# Patient Record
Sex: FEMALE | Race: BLACK OR AFRICAN AMERICAN | NOT HISPANIC OR LATINO | Employment: FULL TIME | URBAN - METROPOLITAN AREA
[De-identification: names, ages, dates, MRNs, and addresses within clinical notes are randomized per-mention and may not be internally consistent; named-entity substitution may affect disease eponyms.]

---

## 2023-04-18 ENCOUNTER — APPOINTMENT (OUTPATIENT)
Dept: LAB | Facility: LAB | Age: 28
End: 2023-04-18
Payer: COMMERCIAL

## 2024-04-16 ENCOUNTER — HOSPITAL ENCOUNTER (EMERGENCY)
Facility: HOSPITAL | Age: 29
Discharge: HOME | End: 2024-04-16
Attending: EMERGENCY MEDICINE
Payer: COMMERCIAL

## 2024-04-16 ENCOUNTER — APPOINTMENT (OUTPATIENT)
Dept: CARDIOLOGY | Facility: HOSPITAL | Age: 29
End: 2024-04-16
Payer: COMMERCIAL

## 2024-04-16 ENCOUNTER — APPOINTMENT (OUTPATIENT)
Dept: RADIOLOGY | Facility: HOSPITAL | Age: 29
End: 2024-04-16
Payer: COMMERCIAL

## 2024-04-16 VITALS
BODY MASS INDEX: 23.88 KG/M2 | OXYGEN SATURATION: 100 % | RESPIRATION RATE: 16 BRPM | HEART RATE: 72 BPM | WEIGHT: 143.3 LBS | HEIGHT: 65 IN | SYSTOLIC BLOOD PRESSURE: 107 MMHG | TEMPERATURE: 98.4 F | DIASTOLIC BLOOD PRESSURE: 70 MMHG
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
## 2024-04-16 DIAGNOSIS — R55 SYNCOPE, UNSPECIFIED SYNCOPE TYPE: ICD-10-CM

## 2024-04-16 DIAGNOSIS — S09.90XA CLOSED HEAD INJURY, INITIAL ENCOUNTER: ICD-10-CM

## 2024-04-16 LAB
ALBUMIN SERPL BCP-MCNC: 4.2 G/DL (ref 3.4–5)
ALP SERPL-CCNC: 36 U/L (ref 33–110)
ALT SERPL W P-5'-P-CCNC: 6 U/L (ref 7–45)
ANION GAP SERPL CALC-SCNC: 14 MMOL/L (ref 10–20)
AST SERPL W P-5'-P-CCNC: 13 U/L (ref 9–39)
ATRIAL RATE: 70 BPM
B-HCG SERPL-ACNC: <2 MIU/ML
BASOPHILS # BLD AUTO: 0.01 X10*3/UL (ref 0–0.1)
BASOPHILS NFR BLD AUTO: 0.3 %
BILIRUB SERPL-MCNC: 0.4 MG/DL (ref 0–1.2)
BUN SERPL-MCNC: 13 MG/DL (ref 6–23)
CALCIUM SERPL-MCNC: 9 MG/DL (ref 8.6–10.3)
CHLORIDE SERPL-SCNC: 106 MMOL/L (ref 98–107)
CO2 SERPL-SCNC: 24 MMOL/L (ref 21–32)
CREAT SERPL-MCNC: 0.85 MG/DL (ref 0.5–1.05)
EGFRCR SERPLBLD CKD-EPI 2021: >90 ML/MIN/1.73M*2
EOSINOPHIL # BLD AUTO: 0.14 X10*3/UL (ref 0–0.7)
EOSINOPHIL NFR BLD AUTO: 3.8 %
ERYTHROCYTE [DISTWIDTH] IN BLOOD BY AUTOMATED COUNT: 14.2 % (ref 11.5–14.5)
GLUCOSE SERPL-MCNC: 97 MG/DL (ref 74–99)
HCT VFR BLD AUTO: 35.2 % (ref 36–46)
HGB BLD-MCNC: 11.1 G/DL (ref 12–16)
IMM GRANULOCYTES # BLD AUTO: 0 X10*3/UL (ref 0–0.7)
IMM GRANULOCYTES NFR BLD AUTO: 0 % (ref 0–0.9)
LYMPHOCYTES # BLD AUTO: 2.1 X10*3/UL (ref 1.2–4.8)
LYMPHOCYTES NFR BLD AUTO: 57.1 %
MCH RBC QN AUTO: 27.1 PG (ref 26–34)
MCHC RBC AUTO-ENTMCNC: 31.5 G/DL (ref 32–36)
MCV RBC AUTO: 86 FL (ref 80–100)
MONOCYTES # BLD AUTO: 0.32 X10*3/UL (ref 0.1–1)
MONOCYTES NFR BLD AUTO: 8.7 %
NEUTROPHILS # BLD AUTO: 1.11 X10*3/UL (ref 1.2–7.7)
NEUTROPHILS NFR BLD AUTO: 30.1 %
NRBC BLD-RTO: 0 /100 WBCS (ref 0–0)
P AXIS: 70 DEGREES
P OFFSET: 187 MS
P ONSET: 135 MS
PLATELET # BLD AUTO: 208 X10*3/UL (ref 150–450)
POTASSIUM SERPL-SCNC: 4.1 MMOL/L (ref 3.5–5.3)
PR INTERVAL: 170 MS
PROT SERPL-MCNC: 6.8 G/DL (ref 6.4–8.2)
Q ONSET: 220 MS
QRS COUNT: 12 BEATS
QRS DURATION: 88 MS
QT INTERVAL: 388 MS
QTC CALCULATION(BAZETT): 419 MS
QTC FREDERICIA: 408 MS
R AXIS: 78 DEGREES
RBC # BLD AUTO: 4.09 X10*6/UL (ref 4–5.2)
SODIUM SERPL-SCNC: 140 MMOL/L (ref 136–145)
T AXIS: 60 DEGREES
T OFFSET: 414 MS
VENTRICULAR RATE: 70 BPM
WBC # BLD AUTO: 3.7 X10*3/UL (ref 4.4–11.3)

## 2024-04-16 PROCEDURE — 70486 CT MAXILLOFACIAL W/O DYE: CPT

## 2024-04-16 PROCEDURE — 36415 COLL VENOUS BLD VENIPUNCTURE: CPT | Performed by: EMERGENCY MEDICINE

## 2024-04-16 PROCEDURE — 84702 CHORIONIC GONADOTROPIN TEST: CPT | Performed by: EMERGENCY MEDICINE

## 2024-04-16 PROCEDURE — 93005 ELECTROCARDIOGRAM TRACING: CPT

## 2024-04-16 PROCEDURE — 96360 HYDRATION IV INFUSION INIT: CPT

## 2024-04-16 PROCEDURE — 70486 CT MAXILLOFACIAL W/O DYE: CPT | Performed by: RADIOLOGY

## 2024-04-16 PROCEDURE — 70450 CT HEAD/BRAIN W/O DYE: CPT

## 2024-04-16 PROCEDURE — 76376 3D RENDER W/INTRP POSTPROCES: CPT | Performed by: RADIOLOGY

## 2024-04-16 PROCEDURE — 84075 ASSAY ALKALINE PHOSPHATASE: CPT | Performed by: EMERGENCY MEDICINE

## 2024-04-16 PROCEDURE — 76377 3D RENDER W/INTRP POSTPROCES: CPT

## 2024-04-16 PROCEDURE — 70450 CT HEAD/BRAIN W/O DYE: CPT | Performed by: RADIOLOGY

## 2024-04-16 PROCEDURE — 85025 COMPLETE CBC W/AUTO DIFF WBC: CPT | Performed by: EMERGENCY MEDICINE

## 2024-04-16 PROCEDURE — 2500000004 HC RX 250 GENERAL PHARMACY W/ HCPCS (ALT 636 FOR OP/ED): Performed by: EMERGENCY MEDICINE

## 2024-04-16 PROCEDURE — 2500000001 HC RX 250 WO HCPCS SELF ADMINISTERED DRUGS (ALT 637 FOR MEDICARE OP): Performed by: EMERGENCY MEDICINE

## 2024-04-16 PROCEDURE — 99285 EMERGENCY DEPT VISIT HI MDM: CPT | Mod: 25

## 2024-04-16 RX ORDER — BACITRACIN ZINC 500 UNIT/G
1 OINTMENT IN PACKET (EA) TOPICAL ONCE
Status: COMPLETED | OUTPATIENT
Start: 2024-04-16 | End: 2024-04-16

## 2024-04-16 RX ADMIN — SODIUM CHLORIDE, SODIUM LACTATE, POTASSIUM CHLORIDE, AND CALCIUM CHLORIDE 1000 ML: 600; 310; 30; 20 INJECTION, SOLUTION INTRAVENOUS at 13:14

## 2024-04-16 RX ADMIN — BACITRACIN ZINC 1 APPLICATION: 500 OINTMENT TOPICAL at 15:05

## 2024-04-16 ASSESSMENT — COLUMBIA-SUICIDE SEVERITY RATING SCALE - C-SSRS
2. HAVE YOU ACTUALLY HAD ANY THOUGHTS OF KILLING YOURSELF?: NO
6. HAVE YOU EVER DONE ANYTHING, STARTED TO DO ANYTHING, OR PREPARED TO DO ANYTHING TO END YOUR LIFE?: NO
1. IN THE PAST MONTH, HAVE YOU WISHED YOU WERE DEAD OR WISHED YOU COULD GO TO SLEEP AND NOT WAKE UP?: NO

## 2024-04-16 ASSESSMENT — PAIN DESCRIPTION - LOCATION: LOCATION: FACE

## 2024-04-16 ASSESSMENT — PAIN DESCRIPTION - PAIN TYPE: TYPE: ACUTE PAIN

## 2024-04-16 ASSESSMENT — PAIN SCALES - GENERAL
PAINLEVEL_OUTOF10: 7
PAINLEVEL_OUTOF10: 0 - NO PAIN

## 2024-04-16 ASSESSMENT — PAIN - FUNCTIONAL ASSESSMENT: PAIN_FUNCTIONAL_ASSESSMENT: 0-10

## 2024-04-16 NOTE — ED PROVIDER NOTES
History/Exam limitations: none.   Additional history was obtained from patient.          HPI:    Irene Soto is a 28 y.o. female reportedly no past medical history presenting after syncopal event.  Patient reportedly was shadowing in the ICU and blood was being drawn which she is seen before without issue.  States that she got lightheaded and dropped down to her knees and briefly lost conscious, no jerking movements reportedly.  She went into a chair and reportedly initially felt slightly improved then had increasing feeling of lightheadedness as if her vision was going then she reports woke up based on the ground and had some jerking movements where she struck her nose on the ground a couple of times after she gained consciousness.  States she has a history of passing out remotely.  Denies any family history of sudden cardiac death.  Denies any seizure history.  Has mild discomfort to her nose and right side of her neck.  No focal weakness or numbness.  No headache, vision changes, chest pain, shortness breath, palpitations, abdominal pain, diarrhea, dysuria.  There were reportedly transient jerking type motions visualized after the 2nd episode of syncope.           Physical Exam:  ED Triage Vitals [04/16/24 1149]   Temperature Heart Rate Respirations BP   36.9 °C (98.4 °F) 79 20 111/71      Pulse Ox Temp Source Heart Rate Source Patient Position   100 % Tympanic Monitor Lying      BP Location FiO2 (%)     Left arm --        GEN:      Alert, NAD  Eyes:       PERRL, EOMI  HENT:      6 mm linear abrasion to right upper nasal bridge, otherwise NC/AT, no visible septal hematoma, OP clear, airway patent, MM  CV:      RRR, no MRG, no LE pitting edema, 2+ radial and pedal pulses  PULM:     CTAB, no w/r/r, easy WOB, symmetric chest rise  ABD:      Soft, NT, ND, no masses, BS +  :       No CVA TTP  NEURO:   A/Ox4, CN II-XII intact, strength 5/5 in all 4 ext, SILT, FNF normal b/l, no pronator drift  MSK:      FROM,  no joint deformities or swelling, mild right paraspinal neck tenderness, no midline CTL spine tenderness  SKIN:       Warm and dry  PSYCH:    Appropriate mood and affect         MDM/ED Course:   Irene Soto is a 28 y.o. female reportedly no past medical history presenting after syncopal event.  Vitals reassuring.  Exam as documented above.  Nontoxic, does not appear lethargic or postictal.  Differential includes syncope secondary to vasovagal, orthostatic hypotension, dehydration, arrhythmia, infectious etiology, ACS, stroke. Unlikely ACS given no chest pressure, chest pain. Doubt TIA/stroke given no report of focal neurologic deficits and evidence of this on exam, age, risk factors.  EKG as below.  No family history of sudden cardiac death.  Episode sounds concerning for vasovagal episode.  Patient has myoclonic jerks, woke up to some jerking type movements briefly.  Low suspicion for seizure given patient had an episode of syncope that was transient followed by resolution and then additional episode of syncope that followed with some brief myoclonic jerks, suspect syncope related myoclonic movements.  IV placed and labs drawn.  hCG negative.  Hemoglobin reassuring at 11.1, consistent with prior.  Chemistry reassuring.  CT head and face obtained and overall reassuring with no acute intracranial findings and no facial fracture.  Linear abrasion to right aspect of nasal bridge, superficial, bacitracin applied.  C-spine was cleared [facility] [employer] [facility] [employer].  Reassessment, patient feels comfortable discharge.  Ambulatory tolerating p.o.  Patient given IV fluids.  Patient was explained findings, exam/study results, the importance of follow up and the plan moving forward; patient verbalized understanding and agreement. All questions were answered and no new questions at this time. Patient will be discharged with strict return precautions, follow up plan with PCP.    EKG reviewed by me: 12:10 PM normal sinus rhythm,  rate 70, normal axis, normal intervals, sinus arrhythmia present, no signs of Brugada/WPW/HOCM/epsilon wave/prolonged QTc, no prior for comparison.     ED Course as of 04/17/24 0204 Tue Apr 16, 2024   1245 C-spine cleared Via [employer].  [JM]      ED Course User Index  [JM] Edward De Dios MD         Diagnoses as of 04/17/24 0204   Syncope, unspecified syncope type   Closed head injury, initial encounter         Chronic medical conditions affecting care listed in MDM. I independently reviewed imaging studies and agreed with radiology reads. I reviewed recent and relevant outside records including PCP notes, Prior discharge summaries, and prior radiology reports.    Procedure  Procedures    Diagnosis:   1.  Syncope    Dispo: Discharged in stable condition      Disclaimer: Portions of this note were dictated by speech recognition. An attempt at proof reading was made to minimize errors. Minor errors in transcription may be present.  Please call if questions.     Edward De Dios MD  04/17/24 0205

## 2024-04-16 NOTE — DISCHARGE INSTRUCTIONS
You were seen emergency today for evaluation of syncope/passing out.  Please follow-up your primary care provider within the next week regarding this emergency room stay.  Please return for worsening symptoms including not limited to worsening recurrent episodes of passing out, headache, vision changes, chest pain, palpitations, shortness of breath, fever, or if any other concerns.  Please continue antibiotic ointment to the abrasion on your nose.

## 2024-04-16 NOTE — ED NOTES
Pt arrived from a clinical rotation. Pt passed out and hit head on floor. Provider at bedside witnessed seizure like activity. Pt has small laceration to her R nare. Pt c/o head pain, but has no neck or back pain. Placed C-Collar on patient. Pt was transferred to [facility] ED. Placed pt on the monitor.     Garry Irvin RN  04/16/24 9541

## 2024-06-26 DIAGNOSIS — J30.1 SEASONAL ALLERGIC RHINITIS DUE TO POLLEN: Primary | ICD-10-CM

## 2024-06-26 RX ORDER — CETIRIZINE HYDROCHLORIDE 10 MG/1
10 TABLET ORAL DAILY PRN
Qty: 30 TABLET | Refills: 0 | Status: SHIPPED | OUTPATIENT
Start: 2024-06-26

## 2024-06-26 RX ORDER — AZELASTINE HYDROCHLORIDE 0.5 MG/ML
SOLUTION/ DROPS OPHTHALMIC
Qty: 6 ML | Refills: 0 | Status: SHIPPED | OUTPATIENT
Start: 2024-06-26

## 2024-06-26 RX ORDER — AZELASTINE 1 MG/ML
SPRAY, METERED NASAL
Qty: 30 ML | Refills: 0 | Status: SHIPPED | OUTPATIENT
Start: 2024-06-26

## 2024-06-26 RX ORDER — FLUTICASONE PROPIONATE 50 MCG
2 SPRAY, SUSPENSION (ML) NASAL DAILY
Qty: 16 ML | Refills: 0 | Status: SHIPPED | OUTPATIENT
Start: 2024-06-26

## 2024-07-29 DIAGNOSIS — J30.1 SEASONAL ALLERGIC RHINITIS DUE TO POLLEN: ICD-10-CM

## 2024-07-30 RX ORDER — CETIRIZINE HYDROCHLORIDE 10 MG/1
10 TABLET ORAL DAILY PRN
Qty: 30 TABLET | Refills: 0 | Status: SHIPPED | OUTPATIENT
Start: 2024-07-30

## 2024-07-30 RX ORDER — FLUTICASONE PROPIONATE 50 MCG
2 SPRAY, SUSPENSION (ML) NASAL DAILY
Qty: 16 ML | Refills: 0 | Status: SHIPPED | OUTPATIENT
Start: 2024-07-30

## 2024-07-30 RX ORDER — AZELASTINE 1 MG/ML
SPRAY, METERED NASAL
Qty: 30 ML | Refills: 0 | Status: SHIPPED | OUTPATIENT
Start: 2024-07-30

## 2024-08-01 ENCOUNTER — TELEPHONE (OUTPATIENT)
Dept: ALLERGY | Facility: HOSPITAL | Age: 29
End: 2024-08-01

## 2025-01-30 ENCOUNTER — APPOINTMENT (OUTPATIENT)
Dept: OBSTETRICS AND GYNECOLOGY | Facility: CLINIC | Age: 30
End: 2025-01-30
Payer: COMMERCIAL

## 2025-02-24 ENCOUNTER — APPOINTMENT (OUTPATIENT)
Dept: OBSTETRICS AND GYNECOLOGY | Facility: CLINIC | Age: 30
End: 2025-02-24
Payer: COMMERCIAL

## 2025-04-21 ENCOUNTER — APPOINTMENT (OUTPATIENT)
Dept: OBSTETRICS AND GYNECOLOGY | Facility: CLINIC | Age: 30
End: 2025-04-21
Payer: COMMERCIAL

## 2025-04-29 ENCOUNTER — APPOINTMENT (OUTPATIENT)
Dept: PRIMARY CARE | Facility: CLINIC | Age: 30
End: 2025-04-29
Payer: COMMERCIAL

## 2025-04-29 VITALS
SYSTOLIC BLOOD PRESSURE: 101 MMHG | DIASTOLIC BLOOD PRESSURE: 67 MMHG | BODY MASS INDEX: 25.27 KG/M2 | WEIGHT: 161 LBS | HEIGHT: 67 IN | HEART RATE: 83 BPM | OXYGEN SATURATION: 100 %

## 2025-04-29 DIAGNOSIS — F41.1 GAD (GENERALIZED ANXIETY DISORDER): ICD-10-CM

## 2025-04-29 DIAGNOSIS — Z11.3 ROUTINE SCREENING FOR STI (SEXUALLY TRANSMITTED INFECTION): ICD-10-CM

## 2025-04-29 DIAGNOSIS — Z11.59 ENCOUNTER FOR HEPATITIS C SCREENING TEST FOR LOW RISK PATIENT: ICD-10-CM

## 2025-04-29 DIAGNOSIS — E53.8 B12 DEFICIENCY: ICD-10-CM

## 2025-04-29 DIAGNOSIS — Z00.00 ENCOUNTER FOR PREVENTATIVE ADULT HEALTH CARE EXAMINATION: Primary | ICD-10-CM

## 2025-04-29 DIAGNOSIS — K58.1 IRRITABLE BOWEL SYNDROME WITH CONSTIPATION: ICD-10-CM

## 2025-04-29 DIAGNOSIS — F33.2 SEVERE EPISODE OF RECURRENT MAJOR DEPRESSIVE DISORDER, WITHOUT PSYCHOTIC FEATURES (MULTI): ICD-10-CM

## 2025-04-29 DIAGNOSIS — R45.86 MOOD SWINGS: ICD-10-CM

## 2025-04-29 DIAGNOSIS — E04.9 ENLARGED THYROID: ICD-10-CM

## 2025-04-29 DIAGNOSIS — E61.1 IRON DEFICIENCY: ICD-10-CM

## 2025-04-29 DIAGNOSIS — J30.1 SEASONAL ALLERGIC RHINITIS DUE TO POLLEN: ICD-10-CM

## 2025-04-29 PROCEDURE — 1036F TOBACCO NON-USER: CPT | Performed by: INTERNAL MEDICINE

## 2025-04-29 PROCEDURE — 3008F BODY MASS INDEX DOCD: CPT | Performed by: INTERNAL MEDICINE

## 2025-04-29 PROCEDURE — 99214 OFFICE O/P EST MOD 30 MIN: CPT | Performed by: INTERNAL MEDICINE

## 2025-04-29 PROCEDURE — 99395 PREV VISIT EST AGE 18-39: CPT | Performed by: INTERNAL MEDICINE

## 2025-04-29 RX ORDER — AZELASTINE 1 MG/ML
1 SPRAY, METERED NASAL 2 TIMES DAILY
Qty: 30 ML | Refills: 0 | Status: SHIPPED | OUTPATIENT
Start: 2025-04-29

## 2025-04-29 RX ORDER — CETIRIZINE HYDROCHLORIDE 10 MG/1
10 TABLET ORAL DAILY PRN
Qty: 90 TABLET | Refills: 1 | Status: SHIPPED | OUTPATIENT
Start: 2025-04-29

## 2025-04-29 RX ORDER — FLUTICASONE PROPIONATE 50 MCG
2 SPRAY, SUSPENSION (ML) NASAL DAILY
Qty: 16 ML | Refills: 0 | Status: SHIPPED | OUTPATIENT
Start: 2025-04-29

## 2025-04-29 ASSESSMENT — PATIENT HEALTH QUESTIONNAIRE - PHQ9
8. MOVING OR SPEAKING SO SLOWLY THAT OTHER PEOPLE COULD HAVE NOTICED. OR THE OPPOSITE, BEING SO FIGETY OR RESTLESS THAT YOU HAVE BEEN MOVING AROUND A LOT MORE THAN USUAL: SEVERAL DAYS
6. FEELING BAD ABOUT YOURSELF - OR THAT YOU ARE A FAILURE OR HAVE LET YOURSELF OR YOUR FAMILY DOWN: MORE THAN HALF THE DAYS
10. IF YOU CHECKED OFF ANY PROBLEMS, HOW DIFFICULT HAVE THESE PROBLEMS MADE IT FOR YOU TO DO YOUR WORK, TAKE CARE OF THINGS AT HOME, OR GET ALONG WITH OTHER PEOPLE: VERY DIFFICULT
SUM OF ALL RESPONSES TO PHQ9 QUESTIONS 1 & 2: 5
2. FEELING DOWN, DEPRESSED OR HOPELESS: NEARLY EVERY DAY
4. FEELING TIRED OR HAVING LITTLE ENERGY: NEARLY EVERY DAY
1. LITTLE INTEREST OR PLEASURE IN DOING THINGS: MORE THAN HALF THE DAYS
5. POOR APPETITE OR OVEREATING: NEARLY EVERY DAY
9. THOUGHTS THAT YOU WOULD BE BETTER OFF DEAD, OR OF HURTING YOURSELF: NOT AT ALL
SUM OF ALL RESPONSES TO PHQ QUESTIONS 1-9: 20
3. TROUBLE FALLING OR STAYING ASLEEP: NEARLY EVERY DAY
7. TROUBLE CONCENTRATING ON THINGS, SUCH AS READING THE NEWSPAPER OR WATCHING TELEVISION: NEARLY EVERY DAY

## 2025-04-29 ASSESSMENT — ANXIETY QUESTIONNAIRES
7. FEELING AFRAID AS IF SOMETHING AWFUL MIGHT HAPPEN: MORE THAN HALF THE DAYS
GAD7 TOTAL SCORE: 20
2. NOT BEING ABLE TO STOP OR CONTROL WORRYING: NEARLY EVERY DAY
4. TROUBLE RELAXING: NEARLY EVERY DAY
3. WORRYING TOO MUCH ABOUT DIFFERENT THINGS: NEARLY EVERY DAY
5. BEING SO RESTLESS THAT IT IS HARD TO SIT STILL: NEARLY EVERY DAY
IF YOU CHECKED OFF ANY PROBLEMS ON THIS QUESTIONNAIRE, HOW DIFFICULT HAVE THESE PROBLEMS MADE IT FOR YOU TO DO YOUR WORK, TAKE CARE OF THINGS AT HOME, OR GET ALONG WITH OTHER PEOPLE: EXTREMELY DIFFICULT
1. FEELING NERVOUS, ANXIOUS, OR ON EDGE: NEARLY EVERY DAY
6. BECOMING EASILY ANNOYED OR IRRITABLE: NEARLY EVERY DAY

## 2025-04-29 ASSESSMENT — PAIN SCALES - GENERAL: PAINLEVEL_OUTOF10: 0-NO PAIN

## 2025-04-29 NOTE — PROGRESS NOTES
Subjective   Patient ID: Irene Soto is a 29 y.o. female who presents for No chief complaint on file..  History of Present Illness  29-year-old female here for preventive care visit, last seen 1/23.  She presented to the ED last year out of concern for syncope with myoclonic jerks, workup was unrevealing including CT head and maxillofacial bones.    Approximately a year ago, she experienced two fainting spells, one accompanied by seizure-like movements. During these episodes, she felt dizzy and lightheaded, slid down a wall, and experienced jerking movements after sitting down. She did not lose consciousness completely but felt as though she might pass out again. A CT scan showed no facial fractures, and an EKG was normal. She has not had similar episodes since then.    A similar episode occurred in 2017 while she was pregnant, involving nausea, vomiting, and jerking movements while sitting. She was evaluated at [facility], and no significant issues were found. Both episodes were associated with fainting spells.    She is experiencing significant anxiety and depression, with symptoms such as feeling down, trouble sleeping, low energy, poor appetite, and difficulty concentrating. These symptoms have been present for more than half of the days in the past two weeks. She is not currently on any medications for these conditions but has previously responded to Zoloft.    She is  and has three children, aged nine, seven, and five, with whom she shares custody with their father. She is considering a travel assignment in [place] to be closer to family, which she believes may help with her current emotional state. Her social history includes working as a nurse, and she is in the process of changing jobs. She has no history of tobacco, alcohol, or drug use, and her diet and exercise routine have been inconsistent.    Family history includes oral cancer in her mother, brain cancer in her grandmother, and  breast cancer in her paternal grandmother. Her father is healthy, and her children are also healthy.    She reports feeling dizzy and lightheaded before fainting spells, but no nausea during the most recent episode. No confusion, tongue biting, incontinence, or significant confusion post-episode.      History  - Allergic rhintis with possible seafood allergy given flonase + azelastine, olaptadine   - Depression/anxiety - previously 1followed by therapist, previously on sertraline referred to psychiatry out of concern for mood disorder  - B12 deficiency  - IBS - mixed -recommend low FODMAP diet  - Iron deficiency     Social:    -  from , 3 children ages 9, 7, 5.   - Nurse! Graduated from nursing school, previously at [employer]   - Diet - gain weight since last being seen, trying to keep it healthy, not a huge eater.   - Exercise - less lately   - Sleep - not much or too much.         PMHx, FHx, Social Hx, Surg Hx personally reviewed at this appointment. No pertinent findings and/or changes from prior (if applicable).      Objective     There were no vitals taken for this visit.   General: Awake, alert, appears stated age   Head/eyes/ears: NCAT, EOMI, PERRL, TM WNL, no cerumen  Throat: moist mucus membranes, no pharyngeal erythema,   Neck: Supple, nontender, no lymphadenopathy, thyroid enlarged, no discreet nodules appreciated   Breast exam: declined   Heart: RRR, no murmurs, rubs or gallops  Lungs: CTA bilaterally, no rhonchi rales or wheezes   Abdomen: Soft, NT/ND  Extremities: No edema, 2+ DP pulses   Skin: No concerning skin lesions on visualized skin   Neuro: AAO x 3, no FND, gait unremarkable       Lab Results   Component Value Date    WBC 3.7 (L) 04/16/2024    HGB 11.1 (L) 04/16/2024    HCT 35.2 (L) 04/16/2024     04/16/2024    CHOL 103 01/18/2023    TRIG 45 01/18/2023    HDL 42.5 01/18/2023    ALT 6 (L) 04/16/2024    AST 13 04/16/2024     04/16/2024    K 4.1 04/16/2024      04/16/2024    CREATININE 0.85 04/16/2024    BUN 13 04/16/2024    CO2 24 04/16/2024    TSH 1.04 01/18/2023    HGBA1C 4.9 03/04/2022         Current Outpatient Medications   Medication Instructions    azelastine (Astelin) 137 mcg (0.1 %) nasal spray INSTILL 2 SPRAY INTO NOSTRILS TWICE DAILY AS NEEDED    azelastine (Optivar) 0.05 % ophthalmic solution INSTILL 1 DROP INTO BOTH EYES TWICE A DAY AS NEEDED    cetirizine (ZYRTEC) 10 mg, oral, Daily PRN    fluticasone (Flonase) 50 mcg/actuation nasal spray 2 sprays, Each Nostril, Daily        CT head wo IV contrast, CT maxillofacial bones wo IV contrast, CT 3D reconstruction  Narrative: Interpreted By:  Terrence Santo,   STUDY:  CT HEAD WO IV CONTRAST; CT FACIAL BONES WO IV CONTRAST; CT 3D  RECONSTRUCTION;  4/16/2024 2:52 pm      INDICATION:  Signs/Symptoms:fall , head strike; Signs/Symptoms:fall, head strike;  Signs/Symptoms:fall.      COMPARISON:  None.      ACCESSION NUMBER(S):  [id number]; [id number]; [id number]      ORDERING CLINICIAN:  TEX MIRELES      TECHNIQUE:  Noncontrast axial CT scan of head and facial bones was performed.  Angled reformats in brain and bone windows were generated. The images  were reviewed in bone, brain, blood and soft tissue windows. 3D  volume rendered images of the facial bones were reconstructed and  reviewed.      FINDINGS:  There is no intra/extra-axial fluid collection, mass effect, or  midline shift. The gray/white matter junction is preserved. The basal  cisterns are patent. The bilateral globes are intact. Partial  opacification of the right ethmoid air cells is noted. Other  paranasal sinuses and mastoid air cells are clear. There is no acute  fracture or dislocation in the facial and cranial bones.      Impression: No evidence of acute cortical infarct or intracranial hemorrhage.      No acute fracture or dislocation in the facial and cranial bones.      Intact bilateral globes.      Signed by: Terrence Santo  4/16/2024 3:26 PM  Dictation workstation:   [id number]  ECG 12 lead  Normal sinus rhythm with sinus arrhythmia  Normal ECG  No previous ECGs available  See ED provider note for full interpretation and clinical correlation  Confirmed by Ruth Ann Braun ([id number]) on 4/16/2024 2:20:31 PM           Assessment & Plan  Adult Health Examination  Age appropriate screening performed   No additional pertinent family history or toxic habits   No high risk sexual behavior, interested in  full STI screen  Cancer screening:   - Discussed self breast exams  - Pap smear 3/21 due for repeat and scheduled   - Skin cancer prevention strategies reviewed   Immunizations UTD   Dental and visual examinations UTD   Discussed adequate Vitamin D intake        Wellness Visit  Routine wellness visit. Family history of cancer noted. No current cancer screening concerns beyond scheduled Pap smear. Immunizations up to date.  - Proceed with scheduled Pap smear  - Encourage self breast exams and skin cancer prevention measures.  - Maintain healthy diet and exercise routine.  - Ensure adequate sleep.  - Continue vitamin D supplementation.  - STI screening ordered.      Depression and Anxiety  Moderate major depressive disorder and anxiety with symptoms of depressed mood, insomnia, fatigue, anorexia, impaired concentration, nervousness, uncontrollable worrying, and restlessness. Previously responsive to sertraline. Discussed need for psychiatric evaluation before medication due to sertraline risks.  - Refer to access clinic for psychiatric evaluation and diagnostic assessment.  - Refer to behavioral health for therapy.  - Consider initiating medication post-psychiatric evaluation.  - Schedule follow-up in 3 months to assess progress.    Syncope with Myoclonic Jerks  Syncope with myoclonic jerks, last episode one year ago. Normal EKG and CT scan. No recurrence or seizure activity.  - Consider neurologist referral if symptoms recur or further  evaluation is desired.    Irritable Bowel Syndrome (IBS) - mixed  IBS with mixed constipation and diarrhea, triggered by certain foods, particularly dairy. Low FODMAP diet attempted, cheese identified as a trigger.  - Continue low FODMAP diet and avoid known triggers, particularly cheese.  - Adjust diet based on symptom monitoring.    Thyromegaly   - Thyroid ultrasound ordered, check TFTs      Followup 3 months           Daniela Sifuentes, DO       This medical note was created with the assistance of artificial intelligence (AI) for documentation purposes. The content has been reviewed and confirmed by the healthcare provider for accuracy and completeness. Patient consented to the use of audio recording and use of AI during their visit.

## 2025-04-29 NOTE — PATIENT INSTRUCTIONS
VISIT SUMMARY:  Today, you were seen for a follow-up on your fainting spells and seizure-like activity, as well as to address your anxiety, depression, and irritable bowel syndrome (IBS). We also conducted a routine wellness visit to review your overall health and preventive care measures.    YOUR PLAN:  -DEPRESSION AND ANXIETY: You have been experiencing moderate depression and anxiety, which includes feeling down, trouble sleeping, low energy, poor appetite, and difficulty concentrating. We discussed the need for a psychiatric evaluation before starting any medication due to the risks associated with sertraline, which you have responded to in the past. You will be referred to an access clinic for a psychiatric evaluation and to behavioral health for therapy. We will consider starting medication after the psychiatric evaluation and will follow up in 3 months to assess your progress.    -SYNCOPE WITH MYOCLONIC JERKS: You have a history of fainting spells with jerking movements, but no episodes have occurred in the past year. Your previous EKG and CT scan were normal. If these symptoms recur or if you desire further evaluation, we may refer you to a neurologist.    -IRRITABLE BOWEL SYNDROME (IBS) - MIXED: You have IBS, which causes both constipation and diarrhea, and is triggered by certain foods, especially dairy. You have been following a low FODMAP diet and identified cheese as a trigger. Continue with the low FODMAP diet and avoid known triggers, adjusting your diet based on your symptoms.    -WELLNESS VISIT: This was a routine wellness visit. We noted your family history of cancer but found no current concerns beyond your scheduled Pap smear. Your immunizations are up to date. Continue with self breast exams, skin cancer prevention, a healthy diet, regular exercise, adequate sleep, and vitamin D supplementation. STI screening has been ordered.    INSTRUCTIONS:  Please follow up in 3 months to assess your  progress with depression and anxiety. Proceed with your scheduled Pap smear. If your fainting spells or seizure-like activity recur, contact us for a possible referral to a neurologist.   Thyroid ultrasound has been ordered. Call the imaging department to have this scheduled.

## 2025-05-02 ENCOUNTER — APPOINTMENT (OUTPATIENT)
Dept: RADIOLOGY | Facility: CLINIC | Age: 30
End: 2025-05-02
Payer: COMMERCIAL

## 2025-05-03 LAB
25(OH)D3+25(OH)D2 SERPL-MCNC: 24 NG/ML (ref 30–100)
ALBUMIN SERPL-MCNC: 4.6 G/DL (ref 3.6–5.1)
ALP SERPL-CCNC: 50 U/L (ref 31–125)
ALT SERPL-CCNC: 12 U/L (ref 6–29)
ANION GAP SERPL CALCULATED.4IONS-SCNC: 8 MMOL/L (CALC) (ref 7–17)
AST SERPL-CCNC: 19 U/L (ref 10–30)
BASOPHILS # BLD AUTO: 18 CELLS/UL (ref 0–200)
BASOPHILS NFR BLD AUTO: 0.3 %
BILIRUB SERPL-MCNC: 0.4 MG/DL (ref 0.2–1.2)
BUN SERPL-MCNC: 13 MG/DL (ref 7–25)
C TRACH RRNA SPEC QL NAA+PROBE: NOT DETECTED
CALCIUM SERPL-MCNC: 9.6 MG/DL (ref 8.6–10.2)
CHLORIDE SERPL-SCNC: 105 MMOL/L (ref 98–110)
CHOLEST SERPL-MCNC: 132 MG/DL
CHOLEST/HDLC SERPL: 2.4 (CALC)
CO2 SERPL-SCNC: 26 MMOL/L (ref 20–32)
CREAT SERPL-MCNC: 0.85 MG/DL (ref 0.5–0.96)
EGFRCR SERPLBLD CKD-EPI 2021: 95 ML/MIN/1.73M2
EOSINOPHIL # BLD AUTO: 177 CELLS/UL (ref 15–500)
EOSINOPHIL NFR BLD AUTO: 3 %
ERYTHROCYTE [DISTWIDTH] IN BLOOD BY AUTOMATED COUNT: 13.6 % (ref 11–15)
EST. AVERAGE GLUCOSE BLD GHB EST-MCNC: 100 MG/DL
EST. AVERAGE GLUCOSE BLD GHB EST-SCNC: 5.5 MMOL/L
FERRITIN SERPL-MCNC: 13 NG/ML (ref 16–154)
GLUCOSE SERPL-MCNC: 79 MG/DL (ref 65–139)
HBA1C MFR BLD: 5.1 %
HBV SURFACE AG SERPL QL IA: NORMAL
HCT VFR BLD AUTO: 38.6 % (ref 35–45)
HCV AB SERPL QL IA: NORMAL
HDLC SERPL-MCNC: 55 MG/DL
HGB BLD-MCNC: 11.9 G/DL (ref 11.7–15.5)
HIV 1+2 AB+HIV1 P24 AG SERPL QL IA: NORMAL
IRON SATN MFR SERPL: 29 % (CALC) (ref 16–45)
IRON SERPL-MCNC: 108 MCG/DL (ref 40–190)
LDLC SERPL CALC-MCNC: 64 MG/DL (CALC)
LYMPHOCYTES # BLD AUTO: 2195 CELLS/UL (ref 850–3900)
LYMPHOCYTES NFR BLD AUTO: 37.2 %
MCH RBC QN AUTO: 26.9 PG (ref 27–33)
MCHC RBC AUTO-ENTMCNC: 30.8 G/DL (ref 32–36)
MCV RBC AUTO: 87.1 FL (ref 80–100)
MONOCYTES # BLD AUTO: 596 CELLS/UL (ref 200–950)
MONOCYTES NFR BLD AUTO: 10.1 %
N GONORRHOEA RRNA SPEC QL NAA+PROBE: NOT DETECTED
NEUTROPHILS # BLD AUTO: 2915 CELLS/UL (ref 1500–7800)
NEUTROPHILS NFR BLD AUTO: 49.4 %
NONHDLC SERPL-MCNC: 77 MG/DL (CALC)
PLATELET # BLD AUTO: 266 THOUSAND/UL (ref 140–400)
PMV BLD REES-ECKER: 12.1 FL (ref 7.5–12.5)
POTASSIUM SERPL-SCNC: 4.3 MMOL/L (ref 3.5–5.3)
PROT SERPL-MCNC: 7.6 G/DL (ref 6.1–8.1)
QUEST GC CT AMPLIFIED (ALWAYS MESSAGE): NORMAL
RBC # BLD AUTO: 4.43 MILLION/UL (ref 3.8–5.1)
SODIUM SERPL-SCNC: 139 MMOL/L (ref 135–146)
T PALLIDUM AB SER QL IA: NEGATIVE
T VAGINALIS RRNA SPEC QL NAA+PROBE: NOT DETECTED
TIBC SERPL-MCNC: 375 MCG/DL (CALC) (ref 250–450)
TRIGL SERPL-MCNC: 53 MG/DL
TSH SERPL-ACNC: 1.48 MIU/L
VIT B12 SERPL-MCNC: 695 PG/ML (ref 200–1100)
WBC # BLD AUTO: 5.9 THOUSAND/UL (ref 3.8–10.8)

## 2025-05-04 DIAGNOSIS — E61.1 IRON DEFICIENCY: Primary | ICD-10-CM

## 2025-05-08 ENCOUNTER — APPOINTMENT (OUTPATIENT)
Dept: RADIOLOGY | Facility: CLINIC | Age: 30
End: 2025-05-08
Payer: COMMERCIAL

## 2025-05-13 ENCOUNTER — APPOINTMENT (OUTPATIENT)
Dept: OBSTETRICS AND GYNECOLOGY | Facility: CLINIC | Age: 30
End: 2025-05-13
Payer: COMMERCIAL

## 2025-06-17 ENCOUNTER — APPOINTMENT (OUTPATIENT)
Dept: BEHAVIORAL HEALTH | Facility: CLINIC | Age: 30
End: 2025-06-17
Payer: COMMERCIAL

## 2025-06-24 ENCOUNTER — APPOINTMENT (OUTPATIENT)
Dept: OBSTETRICS AND GYNECOLOGY | Facility: CLINIC | Age: 30
End: 2025-06-24
Payer: COMMERCIAL